# Patient Record
Sex: MALE | Race: WHITE | NOT HISPANIC OR LATINO | Employment: OTHER | URBAN - METROPOLITAN AREA
[De-identification: names, ages, dates, MRNs, and addresses within clinical notes are randomized per-mention and may not be internally consistent; named-entity substitution may affect disease eponyms.]

---

## 2017-01-31 RX ORDER — LISINOPRIL 10 MG/1
TABLET ORAL
Qty: 90 TABLET | Refills: 0 | OUTPATIENT
Start: 2017-01-31

## 2017-01-31 RX ORDER — LISINOPRIL 10 MG/1
10 TABLET ORAL DAILY
Qty: 90 TABLET | Refills: 3 | Status: SHIPPED | OUTPATIENT
Start: 2017-01-31 | End: 2018-02-05 | Stop reason: SDUPTHER

## 2018-02-05 RX ORDER — LISINOPRIL 10 MG/1
10 TABLET ORAL DAILY
Qty: 90 TABLET | Refills: 3 | Status: SHIPPED | OUTPATIENT
Start: 2018-02-05 | End: 2019-10-30

## 2019-10-24 DIAGNOSIS — I35.1 AORTIC VALVE INSUFFICIENCY, ETIOLOGY OF CARDIAC VALVE DISEASE UNSPECIFIED: Primary | ICD-10-CM

## 2019-10-25 PROBLEM — I38 VALVULAR HEART DISEASE: Status: ACTIVE | Noted: 2019-10-25

## 2019-10-25 PROBLEM — I10 ESSENTIAL HYPERTENSION: Status: ACTIVE | Noted: 2019-10-25

## 2019-10-25 PROBLEM — Z82.49 FAMILY HISTORY OF CORONARY ARTERY DISEASE: Status: ACTIVE | Noted: 2019-10-25

## 2019-10-25 NOTE — PROGRESS NOTES
"  Subjective:     Encounter Date:10/30/2019      Patient ID: Erwin Morel is a 44 y.o. male.    Reason for consultation: Hypertension and valvular heart disease    Problem List:  1.  Valvular heart disease       A.  Echo (8/8/2012): Mild to moderate AR, mild MR, mild TR.  Normal LVEF 55-60%       B.  Echo (11/6/2013): Mild to moderate AS.  Normal LVEF.  Mild MR and mild TR.       C.  Echo (11/4/2015): Normal LVEF 55-60%.  Trace AI and MR.  Mild TR.       D.  Echo (10/30/2019): Normal LVEF.  Trace AI and MR  2.  Hypertension  3.  Family history of coronary artery disease       A.  Stress echo (9/6/2012): Normal.  Hypertensive response to stress  4. Surgical history       A. Status post vasectomy in 2011.       B. Cholecystectomy.    No Known Allergies    No current outpatient medications on file.    HPI:    Patient is a 44-year-old male returns today as a self-referral consult due to his a history of valvular heart disease noted on echocardiograms from 3567-1468 that displayed mild to moderate aortic, mitral and tricuspid regurgitation.  Patient has a family history of coronary artery disease as his father had CABG surgery in his 40s.  He has a history of hypertension and also had a hypertensive response on a stress echocardiogram in 2012.  He was previously taking lisinopril but for reasons that remain unclear he stopped taking it.  He reports he could never really tell a difference being on it or off of it.  He has historically been very active working out at the gym and running but 18 months ago he \"broke his tailbone\" while playing basketball so he has not been quite as active as usual.  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  He also performed his own yard work this past summer.  He was able to be active without any increased dyspnea, chest pain/pressure or tightness.  He also denies palpitations, presyncope or syncope.   He underwent echocardiogram prior to his visit today which showed normal " "LVEF and mild AI and MR and unchanged from prior echo in 2015. They had their children checked out recently due to family history.  He reports one other daughters had a congenital anomaly.      Cardiac Risk Factors:  Family history, hypertension, male gender    Social History:  , 3 children, non smoker, no alcohol use. Gun store owner    Family history:  Family history of CAD, Father had CABG in 40's. Mom's aorta structurally abnormal but no CAD. Mom lung and brain cancer.     Review of Systems:  The following portions of the patient's history were reviewed and updated as appropriate: allergies, current medications and problem list.    The above systems were reviewed and pertinent positives were noted.    Objective:        Blood pressure 164/98, pulse 65, height 188 cm (74\"), weight 102 kg (225 lb 3.2 oz).    Physical Exam    General:Well-developed well-nourished, no acute distress  HEENT: Pupils equal round and reactive to light.  Oropharynx is clear and moist.  CV: Regular rate and rhythm.  Nondisplaced focal PMI  Resp: The lungs are clear bilaterally with no rales or wheezes.  Equal expansion is noted.  GI: The abdomen is soft and nontender with normal bowel sounds throughout.  No hepatosplenomegaly or midline bruits are present.  : deferred  Musculoskeletal: No gross joint deformities are noted  Skin: Warm and dry  Neurologic: Nonfocal  Psychiatric: Normal mood and affect    Hospital Outpatient Visit on 10/30/2019   Component Date Value Ref Range Status   • BSA 10/30/2019 2.2  m^2 In process   • IVSd 10/30/2019 1.0  cm In process   • LVIDd 10/30/2019 4.8  cm In process   • LVIDs 10/30/2019 2.8  cm In process   • LVPWd 10/30/2019 0.87  cm In process   • IVS/LVPW 10/30/2019 1.2   In process   • FS 10/30/2019 41.4  % In process   • EDV(Teich) 10/30/2019 106.6  ml In process   • ESV(Teich) 10/30/2019 29.6  ml In process   • EF(Teich) 10/30/2019 72.3  % In process   • EDV(cubed) 10/30/2019 109.4  ml In " process   • ESV(cubed) 10/30/2019 22.0  ml In process   • EF(cubed) 10/30/2019 79.9  % In process   • LV mass(C)d 10/30/2019 156.3  grams In process   • LV mass(C)dI 10/30/2019 69.7  grams/m^2 In process   • SV(Teich) 10/30/2019 77.1  ml In process   • SI(Teich) 10/30/2019 34.4  ml/m^2 In process   • SV(cubed) 10/30/2019 87.4  ml In process   • SI(cubed) 10/30/2019 39.0  ml/m^2 In process   • Ao root diam 10/30/2019 3.2  cm In process   • Ao root area 10/30/2019 8.2  cm^2 In process   • LA dimension 10/30/2019 3.1  cm In process   • LA/Ao 10/30/2019 0.96   In process   • LVOT diam 10/30/2019 2.1  cm In process   • LVOT area 10/30/2019 3.5  cm^2 In process   • LVOT area(traced) 10/30/2019 3.5  cm^2 In process   • LAd major 10/30/2019 5.9  cm In process   • LVLd ap4 10/30/2019 8.8  cm In process   • EDV(MOD-sp4) 10/30/2019 101.0  ml In process   • LVLs ap4 10/30/2019 7.4  cm In process   • ESV(MOD-sp4) 10/30/2019 36.0  ml In process   • EF(MOD-sp4) 10/30/2019 64.4  % In process   • LVLd ap2 10/30/2019 8.5  cm In process   • EDV(MOD-sp2) 10/30/2019 65.0  ml In process   • LVLs ap2 10/30/2019 7.7  cm In process   • ESV(MOD-sp2) 10/30/2019 36.0  ml In process   • EF(MOD-sp2) 10/30/2019 44.6  % In process   • LA volume 10/30/2019 58.0  ml In process   • EF(MOD-bp) 10/30/2019 56.0  % In process   • SV(MOD-sp4) 10/30/2019 65.0  ml In process   • SI(MOD-sp4) 10/30/2019 29.0  ml/m^2 In process   • SV(MOD-sp2) 10/30/2019 29.0  ml In process   • SI(MOD-sp2) 10/30/2019 12.9  ml/m^2 In process   • Ao root area (BSA corrected) 10/30/2019 1.4   In process   • LV Marlow Vol (BSA corrected) 10/30/2019 45.1  ml/m^2 In process   • LV Sys Vol (BSA corrected) 10/30/2019 16.1  ml/m^2 In process   • LA Volume Index 10/30/2019 25.9  ml/m^2 In process   • MV E max kevin 10/30/2019 62.9  cm/sec In process   • MV A max kevin 10/30/2019 70.6  cm/sec In process   • MV E/A 10/30/2019 0.89   In process   • MV dec time 10/30/2019 0.24  sec In process   •  Ao pk morris 10/30/2019 119.4  cm/sec In process   • Ao max PG 10/30/2019 5.7  mmHg In process   • Ao max PG (full) 10/30/2019 -0.43  mmHg In process   • LOLITA(V,A) 10/30/2019 3.6  cm^2 In process   • LOLITA(V,D) 10/30/2019 3.6  cm^2 In process   • AI max morris 10/30/2019 346.9  cm/sec In process   • AI max PG 10/30/2019 48.1  mmHg In process   • AI dec slope 10/30/2019 132.7  cm/sec^2 In process   • AI P1/2t 10/30/2019 765.5  msec In process   • LV V1 max PG 10/30/2019 6.1  mmHg In process   • LV V1 mean PG 10/30/2019 3.1  mmHg In process   • LV V1 max 10/30/2019 123.9  cm/sec In process   • LV V1 mean 10/30/2019 81.8  cm/sec In process   • LV V1 VTI 10/30/2019 25.9  cm In process   • SV(LVOT) 10/30/2019 89.9  ml In process   • SI(LVOT) 10/30/2019 40.1  ml/m^2 In process   • PA V2 max 10/30/2019 125.2  cm/sec In process   • PA max PG 10/30/2019 6.3  mmHg In process   • PA acc slope 10/30/2019 388.3  cm/sec^2 In process   • PA acc time 10/30/2019 0.15  sec In process   • PA pr(Accel) 10/30/2019 9.3  mmHg In process   • Lat E/e'  10/30/2019 4.7   In process   • Med E/e' 10/30/2019 9.5   In process   • Lat Peak E' Morris 10/30/2019 13.0  cm/sec In process   • Med Peak E' Morris 10/30/2019 6.5  cm/sec In process   • BH CV ECHO OSVALDO - BZI_BMI 10/30/2019 27.6  kilograms/m^2 In process   • BH CV ECHO OSVALDO - BSA(HAYCOCK) 10/30/2019 2.3  m^2 In process   • BH CV ECHO OSVALDO - BZI_METRIC_WEIGHT 10/30/2019 97.5  kg In process   • BH CV ECHO OSVALDO - BZI_METRIC_HEIGHT 10/30/2019 188.0  cm In process   • Avg E/e' ratio 10/30/2019 6.45   In process   • Target HR (85%) 10/30/2019 150  bpm In process   • Max. Pred. HR (100%) 10/30/2019 177  bpm In process   • BH CV VAS BP LEFT ARM 10/30/2019 162/98  mmHg In process   • TDI S' 10/30/2019 11.80  cm/sec In process   • RV Base 10/30/2019 3.60  cm In process   • RV Length 10/30/2019 7.70  cm In process   • RV Mid 10/30/2019 2.20  cm In process   • TAPSE (>1.6) 10/30/2019 2.20  cm2 In process        Diagnostic Data:          ECG 12 Lead  Date/Time: 10/30/2019 9:49 AM  Performed by: Kenya Natarajan APRN  Authorized by: Kenya Natarajan APRN   Comparison: compared with previous ECG   Rhythm: sinus rhythm  BPM: 65    Clinical impression: normal ECG  Comments: NSR  QRS 92 MS  QT/QTc 358/372            Assessment:       ICD-10-CM ICD-9-CM   1. Valvular heart disease I38 424.90   2. Family history of coronary artery disease Z82.49 V17.3   3. Essential hypertension I10 401.9          Plan:         1. Valvular heart disease: Routine surveillance every 4 years unless becomes symptomatic.   2. Reduce risk factors for cardiovascular disease including lifestyle modifications by maintaining normal blood pressure, refrain from smoking and healthy heart diet.  Increase low impact    physical activity using a elliptical  and/or stationary bicycle  3. Obtain recent blood work from PCP for lipids and CMP.   4.  Monitor blood pressures and follow-up with PCP for management.  Would recommend restarting lisinopril  5.  Follow-up in 4 years with repeat echocardiogram      FRANCY Varela scribe for Dr. Preethi Anaya    I Preethi Anaya MD personally performed the services described in this documentation as scribed by the above individual in my presence, and it is both accurate and complete.    Preethi Anaya MD, FACC

## 2019-10-30 ENCOUNTER — CONSULT (OUTPATIENT)
Dept: CARDIOLOGY | Facility: CLINIC | Age: 44
End: 2019-10-30

## 2019-10-30 ENCOUNTER — HOSPITAL ENCOUNTER (OUTPATIENT)
Dept: CARDIOLOGY | Facility: HOSPITAL | Age: 44
Discharge: HOME | End: 2019-10-30
Admitting: INTERNAL MEDICINE

## 2019-10-30 VITALS
WEIGHT: 225.2 LBS | BODY MASS INDEX: 28.9 KG/M2 | HEIGHT: 74 IN | DIASTOLIC BLOOD PRESSURE: 98 MMHG | SYSTOLIC BLOOD PRESSURE: 164 MMHG | HEART RATE: 65 BPM

## 2019-10-30 VITALS — BODY MASS INDEX: 27.59 KG/M2 | WEIGHT: 214.95 LBS | HEIGHT: 74 IN

## 2019-10-30 DIAGNOSIS — I38 VALVULAR HEART DISEASE: Primary | ICD-10-CM

## 2019-10-30 DIAGNOSIS — Z82.49 FAMILY HISTORY OF CORONARY ARTERY DISEASE: ICD-10-CM

## 2019-10-30 DIAGNOSIS — I35.1 AORTIC VALVE INSUFFICIENCY, ETIOLOGY OF CARDIAC VALVE DISEASE UNSPECIFIED: ICD-10-CM

## 2019-10-30 DIAGNOSIS — I10 ESSENTIAL HYPERTENSION: ICD-10-CM

## 2019-10-30 LAB
BH CV ECHO MEAS - AI DEC SLOPE: 132.7 CM/SEC^2
BH CV ECHO MEAS - AI MAX PG: 48.1 MMHG
BH CV ECHO MEAS - AI MAX VEL: 346.9 CM/SEC
BH CV ECHO MEAS - AI P1/2T: 765.5 MSEC
BH CV ECHO MEAS - AO MAX PG (FULL): -0.43 MMHG
BH CV ECHO MEAS - AO MAX PG: 5.7 MMHG
BH CV ECHO MEAS - AO ROOT AREA (BSA CORRECTED): 1.4
BH CV ECHO MEAS - AO ROOT AREA: 8.2 CM^2
BH CV ECHO MEAS - AO ROOT DIAM: 3.2 CM
BH CV ECHO MEAS - AO V2 MAX: 119.4 CM/SEC
BH CV ECHO MEAS - AVA(V,A): 3.6 CM^2
BH CV ECHO MEAS - AVA(V,D): 3.6 CM^2
BH CV ECHO MEAS - BSA(HAYCOCK): 2.3 M^2
BH CV ECHO MEAS - BSA: 2.2 M^2
BH CV ECHO MEAS - BZI_BMI: 27.6 KILOGRAMS/M^2
BH CV ECHO MEAS - BZI_METRIC_HEIGHT: 188 CM
BH CV ECHO MEAS - BZI_METRIC_WEIGHT: 97.5 KG
BH CV ECHO MEAS - EDV(CUBED): 109.4 ML
BH CV ECHO MEAS - EDV(MOD-SP2): 65 ML
BH CV ECHO MEAS - EDV(MOD-SP4): 101 ML
BH CV ECHO MEAS - EDV(TEICH): 106.6 ML
BH CV ECHO MEAS - EF(CUBED): 79.9 %
BH CV ECHO MEAS - EF(MOD-BP): 56 %
BH CV ECHO MEAS - EF(MOD-SP2): 44.6 %
BH CV ECHO MEAS - EF(MOD-SP4): 64.4 %
BH CV ECHO MEAS - EF(TEICH): 72.3 %
BH CV ECHO MEAS - ESV(CUBED): 22 ML
BH CV ECHO MEAS - ESV(MOD-SP2): 36 ML
BH CV ECHO MEAS - ESV(MOD-SP4): 36 ML
BH CV ECHO MEAS - ESV(TEICH): 29.6 ML
BH CV ECHO MEAS - FS: 41.4 %
BH CV ECHO MEAS - IVS/LVPW: 1.2
BH CV ECHO MEAS - IVSD: 1 CM
BH CV ECHO MEAS - LA DIMENSION: 3.1 CM
BH CV ECHO MEAS - LA/AO: 0.96
BH CV ECHO MEAS - LAD MAJOR: 5.9 CM
BH CV ECHO MEAS - LAT PEAK E' VEL: 13 CM/SEC
BH CV ECHO MEAS - LATERAL E/E' RATIO: 4.7
BH CV ECHO MEAS - LV DIASTOLIC VOL/BSA (35-75): 45.1 ML/M^2
BH CV ECHO MEAS - LV MASS(C)D: 156.3 GRAMS
BH CV ECHO MEAS - LV MASS(C)DI: 69.7 GRAMS/M^2
BH CV ECHO MEAS - LV MAX PG: 6.1 MMHG
BH CV ECHO MEAS - LV MEAN PG: 3.1 MMHG
BH CV ECHO MEAS - LV SYSTOLIC VOL/BSA (12-30): 16.1 ML/M^2
BH CV ECHO MEAS - LV V1 MAX: 123.9 CM/SEC
BH CV ECHO MEAS - LV V1 MEAN: 81.8 CM/SEC
BH CV ECHO MEAS - LV V1 VTI: 25.9 CM
BH CV ECHO MEAS - LVIDD: 4.8 CM
BH CV ECHO MEAS - LVIDS: 2.8 CM
BH CV ECHO MEAS - LVLD AP2: 8.5 CM
BH CV ECHO MEAS - LVLD AP4: 8.8 CM
BH CV ECHO MEAS - LVLS AP2: 7.7 CM
BH CV ECHO MEAS - LVLS AP4: 7.4 CM
BH CV ECHO MEAS - LVOT AREA (M): 3.5 CM^2
BH CV ECHO MEAS - LVOT AREA: 3.5 CM^2
BH CV ECHO MEAS - LVOT DIAM: 2.1 CM
BH CV ECHO MEAS - LVPWD: 0.87 CM
BH CV ECHO MEAS - MED PEAK E' VEL: 6.5 CM/SEC
BH CV ECHO MEAS - MEDIAL E/E' RATIO: 9.5
BH CV ECHO MEAS - MV A MAX VEL: 70.6 CM/SEC
BH CV ECHO MEAS - MV DEC TIME: 0.24 SEC
BH CV ECHO MEAS - MV E MAX VEL: 62.9 CM/SEC
BH CV ECHO MEAS - MV E/A: 0.89
BH CV ECHO MEAS - PA ACC SLOPE: 388.3 CM/SEC^2
BH CV ECHO MEAS - PA ACC TIME: 0.15 SEC
BH CV ECHO MEAS - PA MAX PG: 6.3 MMHG
BH CV ECHO MEAS - PA PR(ACCEL): 9.3 MMHG
BH CV ECHO MEAS - PA V2 MAX: 125.2 CM/SEC
BH CV ECHO MEAS - SI(CUBED): 39 ML/M^2
BH CV ECHO MEAS - SI(LVOT): 40.1 ML/M^2
BH CV ECHO MEAS - SI(MOD-SP2): 12.9 ML/M^2
BH CV ECHO MEAS - SI(MOD-SP4): 29 ML/M^2
BH CV ECHO MEAS - SI(TEICH): 34.4 ML/M^2
BH CV ECHO MEAS - SV(CUBED): 87.4 ML
BH CV ECHO MEAS - SV(LVOT): 89.9 ML
BH CV ECHO MEAS - SV(MOD-SP2): 29 ML
BH CV ECHO MEAS - SV(MOD-SP4): 65 ML
BH CV ECHO MEAS - SV(TEICH): 77.1 ML
BH CV ECHO MEAS - TAPSE (>1.6): 2.2 CM2
BH CV ECHO MEASUREMENTS AVERAGE E/E' RATIO: 6.45
BH CV VAS BP LEFT ARM: NORMAL MMHG
BH CV XLRA - RV BASE: 3.6 CM
BH CV XLRA - RV LENGTH: 7.7 CM
BH CV XLRA - RV MID: 2.2 CM
BH CV XLRA - TDI S': 11.8 CM/SEC
LEFT ATRIUM VOLUME INDEX: 25.9 ML/M^2
LEFT ATRIUM VOLUME: 58 ML
LV EF 2D ECHO EST: 55 %
MAXIMAL PREDICTED HEART RATE: 177 BPM
STRESS TARGET HR: 150 BPM

## 2019-10-30 PROCEDURE — 99203 OFFICE O/P NEW LOW 30 MIN: CPT | Performed by: INTERNAL MEDICINE

## 2019-10-30 PROCEDURE — 93306 TTE W/DOPPLER COMPLETE: CPT | Performed by: INTERNAL MEDICINE

## 2019-10-30 PROCEDURE — 93306 TTE W/DOPPLER COMPLETE: CPT

## 2019-10-30 PROCEDURE — 93000 ELECTROCARDIOGRAM COMPLETE: CPT | Performed by: NURSE PRACTITIONER

## 2021-06-11 DIAGNOSIS — I38 VALVULAR HEART DISEASE: Primary | ICD-10-CM

## 2022-04-19 ENCOUNTER — LAB (OUTPATIENT)
Dept: FAMILY MEDICINE CLINIC | Facility: CLINIC | Age: 47
End: 2022-04-19

## 2022-04-19 DIAGNOSIS — M25.561 ACUTE PAIN OF RIGHT KNEE: Primary | ICD-10-CM

## 2022-04-19 DIAGNOSIS — M25.561 ACUTE PAIN OF RIGHT KNEE: ICD-10-CM

## 2022-04-19 PROCEDURE — 36415 COLL VENOUS BLD VENIPUNCTURE: CPT | Performed by: NURSE PRACTITIONER

## 2022-04-19 RX ORDER — PREDNISONE 20 MG/1
TABLET ORAL
Qty: 18 TABLET | Refills: 0 | Status: SHIPPED | OUTPATIENT
Start: 2022-04-19

## 2022-04-19 RX ORDER — SULFAMETHOXAZOLE AND TRIMETHOPRIM 800; 160 MG/1; MG/1
1 TABLET ORAL 2 TIMES DAILY
Qty: 20 TABLET | Refills: 0 | Status: SHIPPED | OUTPATIENT
Start: 2022-04-19

## 2022-04-20 LAB
ALBUMIN SERPL-MCNC: 4.5 G/DL (ref 4–5)
ALBUMIN/GLOB SERPL: 2 {RATIO} (ref 1.2–2.2)
ALP SERPL-CCNC: 56 IU/L (ref 44–121)
ALT SERPL-CCNC: 19 IU/L (ref 0–44)
AST SERPL-CCNC: 20 IU/L (ref 0–40)
BASOPHILS # BLD AUTO: 0.1 X10E3/UL (ref 0–0.2)
BASOPHILS NFR BLD AUTO: 1 %
BILIRUB SERPL-MCNC: 0.5 MG/DL (ref 0–1.2)
BUN SERPL-MCNC: 12 MG/DL (ref 6–24)
BUN/CREAT SERPL: 12 (ref 9–20)
CALCIUM SERPL-MCNC: 9.4 MG/DL (ref 8.7–10.2)
CHLORIDE SERPL-SCNC: 106 MMOL/L (ref 96–106)
CO2 SERPL-SCNC: 23 MMOL/L (ref 20–29)
CREAT SERPL-MCNC: 1.01 MG/DL (ref 0.76–1.27)
EGFRCR SERPLBLD CKD-EPI 2021: 93 ML/MIN/1.73
EOSINOPHIL # BLD AUTO: 0.1 X10E3/UL (ref 0–0.4)
EOSINOPHIL NFR BLD AUTO: 1 %
ERYTHROCYTE [DISTWIDTH] IN BLOOD BY AUTOMATED COUNT: 12.1 % (ref 11.6–15.4)
ERYTHROCYTE [SEDIMENTATION RATE] IN BLOOD BY WESTERGREN METHOD: 2 MM/HR (ref 0–15)
GLOBULIN SER CALC-MCNC: 2.3 G/DL (ref 1.5–4.5)
GLUCOSE SERPL-MCNC: 80 MG/DL (ref 65–99)
HCT VFR BLD AUTO: 45.4 % (ref 37.5–51)
HGB BLD-MCNC: 15.8 G/DL (ref 13–17.7)
IMM GRANULOCYTES # BLD AUTO: 0 X10E3/UL (ref 0–0.1)
IMM GRANULOCYTES NFR BLD AUTO: 0 %
LYMPHOCYTES # BLD AUTO: 1.4 X10E3/UL (ref 0.7–3.1)
LYMPHOCYTES NFR BLD AUTO: 15 %
MCH RBC QN AUTO: 30.2 PG (ref 26.6–33)
MCHC RBC AUTO-ENTMCNC: 34.8 G/DL (ref 31.5–35.7)
MCV RBC AUTO: 87 FL (ref 79–97)
MONOCYTES # BLD AUTO: 1 X10E3/UL (ref 0.1–0.9)
MONOCYTES NFR BLD AUTO: 10 %
NEUTROPHILS # BLD AUTO: 6.9 X10E3/UL (ref 1.4–7)
NEUTROPHILS NFR BLD AUTO: 73 %
PLATELET # BLD AUTO: 232 X10E3/UL (ref 150–450)
POTASSIUM SERPL-SCNC: 4.6 MMOL/L (ref 3.5–5.2)
PROT SERPL-MCNC: 6.8 G/DL (ref 6–8.5)
RBC # BLD AUTO: 5.23 X10E6/UL (ref 4.14–5.8)
SODIUM SERPL-SCNC: 144 MMOL/L (ref 134–144)
URATE SERPL-MCNC: 6.7 MG/DL (ref 3.8–8.4)
WBC # BLD AUTO: 9.4 X10E3/UL (ref 3.4–10.8)

## 2022-11-02 DIAGNOSIS — R53.83 OTHER FATIGUE: Primary | ICD-10-CM

## 2022-11-02 DIAGNOSIS — I10 PRIMARY HYPERTENSION: ICD-10-CM

## 2022-11-02 DIAGNOSIS — R06.83 SNORING: ICD-10-CM

## 2023-11-08 ENCOUNTER — AMBULATORY SURGICAL CENTER (OUTPATIENT)
Dept: URBAN - METROPOLITAN AREA SURGERY 10 | Facility: SURGERY | Age: 48
End: 2023-11-08

## 2023-11-08 ENCOUNTER — OFFICE (OUTPATIENT)
Dept: URBAN - METROPOLITAN AREA PATHOLOGY 4 | Facility: PATHOLOGY | Age: 48
End: 2023-11-08

## 2023-11-08 DIAGNOSIS — K21.9 GASTRO-ESOPHAGEAL REFLUX DISEASE WITHOUT ESOPHAGITIS: ICD-10-CM

## 2023-11-08 DIAGNOSIS — K20.0 EOSINOPHILIC ESOPHAGITIS: ICD-10-CM

## 2023-11-08 DIAGNOSIS — J39.2 OTHER DISEASES OF PHARYNX: ICD-10-CM

## 2023-11-08 DIAGNOSIS — K29.80 DUODENITIS WITHOUT BLEEDING: ICD-10-CM

## 2023-11-08 DIAGNOSIS — R13.10 DYSPHAGIA, UNSPECIFIED: ICD-10-CM

## 2023-11-08 DIAGNOSIS — K31.89 OTHER DISEASES OF STOMACH AND DUODENUM: ICD-10-CM

## 2023-11-08 DIAGNOSIS — R14.0 ABDOMINAL DISTENSION (GASEOUS): ICD-10-CM

## 2023-11-08 DIAGNOSIS — K22.4 DYSKINESIA OF ESOPHAGUS: ICD-10-CM

## 2023-11-08 PROCEDURE — 43249 ESOPH EGD DILATION <30 MM: CPT | Performed by: INTERNAL MEDICINE

## 2023-11-08 PROCEDURE — 43239 EGD BIOPSY SINGLE/MULTIPLE: CPT | Mod: 59 | Performed by: INTERNAL MEDICINE

## 2023-11-08 PROCEDURE — 88305 TISSUE EXAM BY PATHOLOGIST: CPT | Performed by: INTERNAL MEDICINE

## 2023-12-12 ENCOUNTER — OFFICE (OUTPATIENT)
Dept: URBAN - METROPOLITAN AREA CLINIC 4 | Facility: CLINIC | Age: 48
End: 2023-12-12
Payer: COMMERCIAL

## 2023-12-12 VITALS — SYSTOLIC BLOOD PRESSURE: 132 MMHG | WEIGHT: 232 LBS | HEIGHT: 74 IN | DIASTOLIC BLOOD PRESSURE: 80 MMHG

## 2023-12-12 DIAGNOSIS — K86.81 EXOCRINE PANCREATIC INSUFFICIENCY: ICD-10-CM

## 2023-12-12 DIAGNOSIS — R14.0 ABDOMINAL DISTENSION (GASEOUS): ICD-10-CM

## 2023-12-12 DIAGNOSIS — K20.0 EOSINOPHILIC ESOPHAGITIS: ICD-10-CM

## 2023-12-12 DIAGNOSIS — K58.0 IRRITABLE BOWEL SYNDROME WITH DIARRHEA: ICD-10-CM

## 2023-12-12 DIAGNOSIS — K59.89 OTHER SPECIFIED FUNCTIONAL INTESTINAL DISORDERS: ICD-10-CM

## 2023-12-12 DIAGNOSIS — R10.11 RIGHT UPPER QUADRANT PAIN: ICD-10-CM

## 2023-12-12 DIAGNOSIS — R10.9 UNSPECIFIED ABDOMINAL PAIN: ICD-10-CM

## 2023-12-12 PROCEDURE — 99214 OFFICE O/P EST MOD 30 MIN: CPT | Performed by: INTERNAL MEDICINE

## 2023-12-12 RX ORDER — RIFAXIMIN 550 MG/1
1650 TABLET ORAL
Qty: 42 | Refills: 2 | Status: ACTIVE
Start: 2023-12-12

## 2023-12-12 RX ORDER — BUSPIRONE HYDROCHLORIDE 10 MG/1
TABLET ORAL
Qty: 60 | Refills: 11 | Status: ACTIVE
Start: 2023-12-12

## 2023-12-13 ENCOUNTER — TRANSCRIBE ORDERS (OUTPATIENT)
Dept: NUTRITION | Facility: HOSPITAL | Age: 48
End: 2023-12-13
Payer: COMMERCIAL

## 2023-12-13 DIAGNOSIS — R14.0 ABDOMINAL DISTENTION: Primary | ICD-10-CM

## 2023-12-13 DIAGNOSIS — K59.1 FUNCTIONAL DIARRHEA: ICD-10-CM

## 2023-12-13 DIAGNOSIS — K86.81 CONGENITAL DEFICIENCY OF PANCREATIC LIPASE: ICD-10-CM

## 2023-12-13 DIAGNOSIS — K58.0 IRRITABLE BOWEL SYNDROME WITH DIARRHEA: ICD-10-CM

## 2023-12-27 ENCOUNTER — HOSPITAL ENCOUNTER (OUTPATIENT)
Dept: NUTRITION | Facility: HOSPITAL | Age: 48
Setting detail: RECURRING SERIES
Discharge: HOME | End: 2023-12-27

## 2023-12-27 PROCEDURE — 97802 MEDICAL NUTRITION INDIV IN: CPT

## 2023-12-27 NOTE — CONSULTS
[facility] Nutrition Services          Initial 60 Minute Nutrition Visit    Date: 2023   Patient Name: Erwin Morel  : 1975   MRN: [id number]   Referring Provider: Jerry He MD    Reason for Visit: IBS  Visit Format: TEAMS    Nutrition Assessment       Social History:   Social History     Socioeconomic History    Marital status:    Tobacco Use    Smoking status: Never    Smokeless tobacco: Never   Substance and Sexual Activity    Alcohol use: No    Drug use: No    Sexual activity: Defer     Active Problem List:   Patient Active Problem List    Diagnosis     Essential hypertension [I10]     Family history of coronary artery disease [Z82.49]     Valvular heart disease [I38]       Current Medications:   Current Outpatient Medications:     predniSONE (DELTASONE) 20 MG tablet, 3 QD x 3 days, 2 QD x 3 days, 1 QD x 3 days, Disp: 18 tablet, Rfl: 0    sulfamethoxazole-trimethoprim (Bactrim DS) 800-160 MG per tablet, Take 1 tablet by mouth 2 (Two) Times a Day., Disp: 20 tablet, Rfl: 0    Labs: NA    Hunger Vital Sign Food Insecurity Assessment:  Within the past 12 months I/we worried whether our food would run out before I/we got money to buy more: No   Within the past 12 months the food I/we bought just didn't last and I/we didn't have money to get more: No   Use of food assistance programs (WIC, food stamps, food haile) No       Food & Nutrition Related History       Food Allergies: Grapes, pineapple, apple, gluten, avocado  Food Intolerances: None indicated  Food Behavior: Patient will not eat to avoid GI upset  Nutrition Impact Symptoms:  Bloating, nausea, some reflux. Previously experienced vomiting and diarrhea.  Has since improved d/t  medication changes.  Gastrointestinal conditions that impact intake or food choices: EPI and IBS  Details at home: Lives with wife and children  Who prepares most meals: Wife   Who does grocery shopping: Wife   How many meals are purchased  "from fast food/sit down restaurants per week:  Did not discuss  Difficulty chewin - Normal  Difficulty swallowin - Normal  Diet requirement related to personal preference or cultural belief:  Avoids many foods and sticks with \"safe\" foods d/t GI distress.  History of eating disorder/disordered eating habits: None  Language/communication details: English  Barriers to learning: No barriers identified at this time    24 Hour Recall:   Time Food/beverages consumed   S GF chex mix and vanilla yogurt   D Corn tacos, tomatoes, and cheese   S Popcorn                         Additional comments: Patient does not eat consistently. Usually 2 meals per day and some snacks. Does not consume gluten at all. Drinks about 15 propel urias per day.     Anthropometrics      Height:   Ht Readings from Last 1 Encounters:   10/30/19 188 cm (74.02\")     Weight:   Wt Readings from Last 3 Encounters:   10/30/19 97.5 kg (214 lb 15.2 oz)   10/30/19 102 kg (225 lb 3.2 oz)     BMI: There is no height or weight on file to calculate BMI.   Weight Change: None     Physical Activity     Barriers to physical activity: Jonathan knox a few years ago     Physical activity comments: Getting back into biking     Estimated Needs     Estimated Energy Needs: did not discuss    Estimated Protein Needs: did not discuss     Estimated Fluid Needs: current intake meets needs     Discussion / Education      Patient seen via Teams for IBS/low fodmap referral. Patient reports he had his gallbladder removed about 10 years ago and since then has had GI issues. Patient has gone through testing, he is very allergic to gluten and has removed it from his diet. Patient diagnosed with EPI, but is not currently taking Creon d/t a reaction.     RD educated patient on what fodmaps are. Reviewed the importance of hydration and fiber. Discussed high fodmap foods and their low fodmap alternatives.  Discussed removing high fodmap foods for 2-3 weeks, then reintroducing a " high fodmap food every few days. RD suggested monitoring symptoms in a food diary. High fiber food list provided and RD recommended 38 grams of fiber per day.     Patient states he believes he lacks the ability to digest sugars. Recommended speaking with MD as patient may lack the ability to digest sucrase indicating CSID.     Assessment of patient engagement: Engaged    Measurement of understanding: Patient verbalized understanding    Resources Provided: [facility] Fodmap Basic     Goal (s)      Goal 1: Eliminate high fodmap foods for 2-3 weeks     Goal 2: Reintroduce high fodmap foods, keeping track of symptoms     Goal 3: Increase fiber intake to 38 grams/d     Plan of Care     PES Statement:   Altered GI function related to IBS/EPI as evidence by bloating, nausea, and decreased appetite.     Follow Up Visit      Follow Up:   Patient to schedule    Total of 60 minutes spent with patient on nutrition counseling. Education based on Academy of Nutrition and Dietetics guidelines. Patient was provided with RD's contact information. Thank you for this referral.

## 2024-01-16 ENCOUNTER — OFFICE (OUTPATIENT)
Dept: URBAN - METROPOLITAN AREA CLINIC 4 | Facility: CLINIC | Age: 49
End: 2024-01-16

## 2024-01-16 VITALS — HEIGHT: 74 IN | SYSTOLIC BLOOD PRESSURE: 130 MMHG | WEIGHT: 224 LBS | DIASTOLIC BLOOD PRESSURE: 84 MMHG

## 2024-01-16 DIAGNOSIS — R14.0 ABDOMINAL DISTENSION (GASEOUS): ICD-10-CM

## 2024-01-16 DIAGNOSIS — K63.8219 SMALL INTESTINAL BACTERIAL OVERGROWTH, UNSPECIFIED: ICD-10-CM

## 2024-01-16 DIAGNOSIS — R10.9 UNSPECIFIED ABDOMINAL PAIN: ICD-10-CM

## 2024-01-16 DIAGNOSIS — K59.89 OTHER SPECIFIED FUNCTIONAL INTESTINAL DISORDERS: ICD-10-CM

## 2024-01-16 DIAGNOSIS — K86.81 EXOCRINE PANCREATIC INSUFFICIENCY: ICD-10-CM

## 2024-01-16 PROCEDURE — 99214 OFFICE O/P EST MOD 30 MIN: CPT | Performed by: INTERNAL MEDICINE

## 2024-01-22 ENCOUNTER — OFFICE (OUTPATIENT)
Dept: URBAN - METROPOLITAN AREA PATHOLOGY 4 | Facility: PATHOLOGY | Age: 49
End: 2024-01-22

## 2024-01-22 ENCOUNTER — AMBULATORY SURGICAL CENTER (OUTPATIENT)
Dept: URBAN - METROPOLITAN AREA SURGERY 10 | Facility: SURGERY | Age: 49
End: 2024-01-22

## 2024-01-22 DIAGNOSIS — D12.0 BENIGN NEOPLASM OF CECUM: ICD-10-CM

## 2024-01-22 DIAGNOSIS — D12.4 BENIGN NEOPLASM OF DESCENDING COLON: ICD-10-CM

## 2024-01-22 DIAGNOSIS — R14.0 ABDOMINAL DISTENSION (GASEOUS): ICD-10-CM

## 2024-01-22 DIAGNOSIS — R10.9 UNSPECIFIED ABDOMINAL PAIN: ICD-10-CM

## 2024-01-22 DIAGNOSIS — K64.1 SECOND DEGREE HEMORRHOIDS: ICD-10-CM

## 2024-01-22 PROCEDURE — 88305 TISSUE EXAM BY PATHOLOGIST: CPT | Performed by: INTERNAL MEDICINE

## 2024-01-22 PROCEDURE — 45385 COLONOSCOPY W/LESION REMOVAL: CPT | Performed by: INTERNAL MEDICINE

## 2024-04-08 ENCOUNTER — OFFICE VISIT (OUTPATIENT)
Dept: FAMILY MEDICINE CLINIC | Facility: CLINIC | Age: 49
End: 2024-04-08
Payer: COMMERCIAL

## 2024-04-08 VITALS
BODY MASS INDEX: 29.77 KG/M2 | SYSTOLIC BLOOD PRESSURE: 140 MMHG | HEART RATE: 79 BPM | HEIGHT: 74 IN | WEIGHT: 232 LBS | OXYGEN SATURATION: 95 % | DIASTOLIC BLOOD PRESSURE: 90 MMHG

## 2024-04-08 DIAGNOSIS — M25.521 ELBOW PAIN, RIGHT: Primary | ICD-10-CM

## 2024-04-08 PROCEDURE — 99213 OFFICE O/P EST LOW 20 MIN: CPT | Performed by: NURSE PRACTITIONER

## 2024-04-08 RX ORDER — PREDNISONE 20 MG/1
TABLET ORAL
Qty: 18 TABLET | Refills: 0 | Status: SHIPPED | OUTPATIENT
Start: 2024-04-08

## 2024-04-08 NOTE — PROGRESS NOTES
"Chief Complaint  Elbow Pain (Right elbow pain x several days, only getting worse )    Subjective          Erwin Morel presents to [facility]  History of Present Illness  Pt has had right elbow pain x 1 week. He denies known injury but worked with remodeling prior to onset. The pain radiates down his right arm and he feels that his  strength is affected.   Elbow Pain  Associated symptoms include arthralgias. Pertinent negatives include no chest pain, fatigue or fever.       Objective   Vital Signs:   /90   Pulse 79   Ht 188 cm (74\")   Wt 105 kg (232 lb)   SpO2 95%   BMI 29.79 kg/m²     Body mass index is 29.79 kg/m².    Review of Systems   Constitutional:  Negative for fatigue and fever.   Respiratory:  Negative for shortness of breath.    Cardiovascular:  Negative for chest pain, palpitations and leg swelling.   Musculoskeletal:  Positive for arthralgias.   Neurological:  Negative for syncope.   Psychiatric/Behavioral:  The patient is not nervous/anxious.           Current Outpatient Medications:     predniSONE (DELTASONE) 20 MG tablet, 3 QD x 3 days, 2 QD x 3 days, 1 QD x 3 days, Disp: 18 tablet, Rfl: 0      Allergies: Patient has no known allergies.    Physical Exam  Constitutional:       Appearance: Normal appearance.   HENT:      Head: Normocephalic.   Eyes:      Conjunctiva/sclera: Conjunctivae normal.      Pupils: Pupils are equal, round, and reactive to light.   Cardiovascular:      Rate and Rhythm: Normal rate and regular rhythm.      Heart sounds: Normal heart sounds.   Pulmonary:      Effort: Pulmonary effort is normal.      Breath sounds: Normal breath sounds.   Abdominal:      Tenderness: There is no abdominal tenderness.   Musculoskeletal:         General: Normal range of motion.      Comments: Tenderness right lateral elbow   Skin:     General: Skin is warm and dry.      Capillary Refill: Capillary refill takes less than 2 seconds.   Neurological:      " General: No focal deficit present.      Mental Status: He is alert and oriented to person, place, and time.   Psychiatric:         Mood and Affect: Mood normal.         Behavior: Behavior normal.         Thought Content: Thought content normal.         Judgment: Judgment normal.          Result Review :                   Assessment and Plan    Diagnoses and all orders for this visit:    1. Elbow pain, right (Primary)  Comments:  Apply ice to painful areas and ROM stretching. Finish prednisone. RTC for worsened sx and if not improving in 1 week.  Orders:  -     predniSONE (DELTASONE) 20 MG tablet; 3 QD x 3 days, 2 QD x 3 days, 1 QD x 3 days  Dispense: 18 tablet; Refill: 0                Follow Up   Return in about 1 week (around 4/15/2024) for if not improving or sooner if symptoms worsen.  Patient was given instructions and counseling regarding his condition or for health maintenance advice. Please see specific information pulled into the AVS if appropriate.     FRANCY Valencia

## 2024-06-03 DIAGNOSIS — M25.521 ELBOW PAIN, RIGHT: Primary | ICD-10-CM

## 2024-06-04 ENCOUNTER — OFFICE VISIT (OUTPATIENT)
Dept: ORTHOPEDIC SURGERY | Facility: CLINIC | Age: 49
End: 2024-06-04
Payer: COMMERCIAL

## 2024-06-04 VITALS
DIASTOLIC BLOOD PRESSURE: 96 MMHG | WEIGHT: 228.5 LBS | SYSTOLIC BLOOD PRESSURE: 142 MMHG | HEIGHT: 74 IN | BODY MASS INDEX: 29.33 KG/M2

## 2024-06-04 DIAGNOSIS — M25.521 RIGHT ELBOW PAIN: Primary | ICD-10-CM

## 2024-06-04 DIAGNOSIS — M77.11 RIGHT LATERAL EPICONDYLITIS: ICD-10-CM

## 2024-06-04 DIAGNOSIS — M25.421 ELBOW SWELLING, RIGHT: ICD-10-CM

## 2024-06-04 RX ORDER — TRIAMCINOLONE ACETONIDE 40 MG/ML
40 INJECTION, SUSPENSION INTRA-ARTICULAR; INTRAMUSCULAR
Status: COMPLETED | OUTPATIENT
Start: 2024-06-04 | End: 2024-06-04

## 2024-06-04 RX ORDER — LIDOCAINE HYDROCHLORIDE 10 MG/ML
2 INJECTION, SOLUTION EPIDURAL; INFILTRATION; INTRACAUDAL; PERINEURAL
Status: COMPLETED | OUTPATIENT
Start: 2024-06-04 | End: 2024-06-04

## 2024-06-04 RX ADMIN — LIDOCAINE HYDROCHLORIDE 2 ML: 10 INJECTION, SOLUTION EPIDURAL; INFILTRATION; INTRACAUDAL; PERINEURAL at 09:14

## 2024-06-04 RX ADMIN — TRIAMCINOLONE ACETONIDE 40 MG: 40 INJECTION, SUSPENSION INTRA-ARTICULAR; INTRAMUSCULAR at 09:14

## 2024-06-04 NOTE — PROGRESS NOTES
[facility] [place]     Office Visit       Date: 06/04/2024   Patient Name: Erwin Morel  MRN: [id number]  YOB: 1975    Referring Physician: Tegan Morel APRN     Chief Complaint:   Chief Complaint   Patient presents with    Right Elbow - Pain       History of Present Illness:   Erwin Morel is a 49 y.o. male who presents with new problem of: right elbow pain.  Onset: atraumatic and gradual in nature. The issue has been ongoing for 2 month(s). Pain is a 7/10 on the pain scale. Pain is described as throbbing and shooting. Associated symptoms include pain and stiffness. The pain is worse with any movement of the joint; pain medication and/or NSAID improve the pain. Previous treatments have included: NSAIDS.    Subjective   Review of Systems: Review of Systems   Constitutional:  Negative for chills, fever, unexpected weight gain and unexpected weight loss.   HENT:  Negative for congestion, postnasal drip and rhinorrhea.    Eyes:  Negative for blurred vision.   Respiratory:  Negative for shortness of breath.    Cardiovascular:  Negative for leg swelling.   Gastrointestinal:  Negative for abdominal pain, nausea and vomiting.   Genitourinary:  Negative for difficulty urinating.   Musculoskeletal:  Positive for arthralgias. Negative for gait problem, joint swelling and myalgias.   Skin:  Negative for skin lesions and wound.   Neurological:  Negative for dizziness, weakness, light-headedness and numbness.   Hematological:  Does not bruise/bleed easily.   Psychiatric/Behavioral:  Negative for depressed mood.         I have reviewed the following portions of the patient's history:History of Present Illness and review of systems.    Past Medical History:   Past Medical History:   Diagnosis Date    Hypertension     Valvular heart disease        Past Surgical History:   Past Surgical History:   Procedure Laterality Date    CHOLECYSTECTOMY       "VASECTOMY         Family History:   Family History   Problem Relation Age of Onset    Heart disease Mother     Heart disease Father     No Known Problems Brother        Social History:   Social History     Socioeconomic History    Marital status:    Tobacco Use    Smoking status: Never     Passive exposure: Never    Smokeless tobacco: Never   Vaping Use    Vaping status: Never Used   Substance and Sexual Activity    Alcohol use: No    Drug use: No    Sexual activity: Defer       Medications: No current outpatient medications on file.    Allergies: No Known Allergies    I reviewed the patient's chief complaint, history of present illness, review of systems, past medical history, surgical history, family history, social history, medications and allergy list.     Objective    Vital Signs:   Vitals:    06/04/24 0852   BP: 142/96   Weight: 104 kg (228 lb 8 oz)   Height: 187 cm (73.62\")     Body mass index is 29.64 kg/m².   BMI is >= 25 and <30. (Overweight) The following options were offered after discussion;: exercise counseling/recommendations and nutrition counseling/recommendations      Patient reports that he is a non-smoker and has not ever been a smoker.  This behavior was applauded and he was encouraged to continue in smoking cessation.  We will continue to monitor at subsequent visits.    Ortho Exam:  Constitutional: General Appearance: healthy-appearing, NAD, and normal body habitus.   Psychiatric: Orientation: oriented to person, place, and time. Mood and Affect: normal mood and affect and active and alert.   Skin: Right Upper Extremity: normal. Left Upper Extremity: normal.   Right Elbow Exam:   Normal contour of the elbow forearm and wrist.   Negative ecchymosis and negative swelling.   Full range of motion of the elbow without pain. Full supination and pronation.   no laxity with varus or valgus stress   Positive tenderness to palpation over the lateral epicondyle and along the extensor digitorum " tendon origin.   Pain with resisted wrist extension.   Pain with resisted middle finger extension.   Negative pain with index finger extension.   Sensation grossly intact to all digits.   Radial pulse intact.  Left Elbow Exam:   shows no evidence of tenderness to palpation.   no pain with extension of the wrist or middle finger.   Sensation grossly intact throughout.    Results Review:   Imaging Results (Last 24 Hours)       Procedure Component Value Units Date/Time    XR Elbow 3+ View Right [[id number]] Resulted: 06/04/24 0845     Updated: 06/04/24 0850        I personally reviewed and interpreted the radiographs of the right elbow from 6/4/2024.  No acute fracture or dislocation.  No significant degenerative changes identified.  No large enthesophytes.    Procedures    Assessment / Plan    Assessment/Plan:   Diagnoses and all orders for this visit:    1. Right elbow pain (Primary)  -     XR Elbow 3+ View Right    2. Right lateral epicondylitis    3. Elbow swelling, right    Other orders  -     - Hand/Upper Extremity Injection    Right lateral elbow pain ongoing for the last 2 months.  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  He has tried ice and NSAIDs but without significant relief of symptoms.  They are quite limiting at this time.  Radiographs of his right elbow do not show any acute or degenerative osseous abnormality.  We reviewed them in detail.  I discussed with him the diagnosis of lateral epicondylitis.  I recommend that we give him a home exercise program of stretches.  Discussed with him activity modification is much as possible reasonably.  Continue with the ice and NSAIDs.  Discussed the role for corticosteroid injection into the right elbow today.  He elects proceed and tolerated well.  See procedure note.  Also fitting for a strap brace that may benefit him at work as well.  I will see him back as his symptoms dictate.    Previous  imaging studies reviewed: 6/4/2024-radiographs of the right elbow.    Previous documentation reviewed: 4/8/2024-office visit-Tegan S[facility].    Previous laboratory results reviewed: 4/19/2022-creatinine 1.01, EGFR 93.    Follow Up:   No follow-ups on file.      Clay Brown MD  [facility]

## 2024-06-04 NOTE — PROGRESS NOTES
Procedure   - Hand/Upper Extremity Injection: R elbow for lateral epicondylitis on 6/4/2024 9:14 AM  Indications: pain  Details: 25 G needle, lateral approach  Medications: 2 mL lidocaine PF 1% 1 %; 40 mg triamcinolone acetonide 40 MG/ML  Outcome: tolerated well, no immediate complications  Procedure, treatment alternatives, risks and benefits explained, specific risks discussed. Consent was given by the patient. Immediately prior to procedure a time out was called to verify the correct patient, procedure, equipment, support staff and site/side marked as required. Patient was prepped and draped in the usual sterile fashion.

## 2024-07-19 ENCOUNTER — OFFICE VISIT (OUTPATIENT)
Dept: FAMILY MEDICINE CLINIC | Facility: CLINIC | Age: 49
End: 2024-07-19
Payer: COMMERCIAL

## 2024-07-19 VITALS
WEIGHT: 223 LBS | HEIGHT: 74 IN | HEART RATE: 74 BPM | SYSTOLIC BLOOD PRESSURE: 140 MMHG | DIASTOLIC BLOOD PRESSURE: 100 MMHG | BODY MASS INDEX: 28.62 KG/M2 | OXYGEN SATURATION: 95 %

## 2024-07-19 DIAGNOSIS — I10 PRIMARY HYPERTENSION: ICD-10-CM

## 2024-07-19 DIAGNOSIS — Z13.1 SCREENING FOR DIABETES MELLITUS: ICD-10-CM

## 2024-07-19 DIAGNOSIS — Z00.00 ROUTINE MEDICAL EXAM: Primary | ICD-10-CM

## 2024-07-19 DIAGNOSIS — Z13.220 SCREENING FOR LIPID DISORDERS: ICD-10-CM

## 2024-07-19 DIAGNOSIS — E56.9 VITAMIN DEFICIENCY: ICD-10-CM

## 2024-07-19 DIAGNOSIS — R42 DIZZINESS: ICD-10-CM

## 2024-07-19 DIAGNOSIS — M54.2 NECK PAIN: ICD-10-CM

## 2024-07-19 PROCEDURE — 99396 PREV VISIT EST AGE 40-64: CPT | Performed by: NURSE PRACTITIONER

## 2024-07-19 RX ORDER — LOSARTAN POTASSIUM 25 MG/1
25 TABLET ORAL DAILY
Qty: 90 TABLET | Refills: 3 | Status: SHIPPED | OUTPATIENT
Start: 2024-07-19

## 2024-07-19 RX ORDER — LOSARTAN POTASSIUM 25 MG/1
25 TABLET ORAL DAILY
Qty: 90 TABLET | Refills: 3 | Status: SHIPPED | OUTPATIENT
Start: 2024-07-19 | End: 2024-07-19 | Stop reason: SDUPTHER

## 2024-07-19 NOTE — PROGRESS NOTES
"Chief Complaint  Annual Exam    Subjective          Erwin Morel presents to [facility] for preventative yearly exam.   History of Present Illness  Pt is here for a physical exam and labs. His BP has been elevated for the past few weeks. He has had chronic GI issues with abdominal pain and has seen GI. He has had neck pain at times. He has had dizziness and blurred vision at times.      Objective   Vital Signs:   /100   Pulse 74   Ht 187 cm (73.62\")   Wt 101 kg (223 lb)   SpO2 95%   BMI 28.93 kg/m²     Body mass index is 28.93 kg/m².    Predictive Model Details   No score data available for Risk of Fall        PHQ-9 Depression Screening  Little interest or pleasure in doing things?     Feeling down, depressed, or hopeless?     Trouble falling or staying asleep, or sleeping too much?     Feeling tired or having little energy?     Poor appetite or overeating?     Feeling bad about yourself - or that you are a failure or have let yourself or your family down?     Trouble concentrating on things, such as reading the newspaper or watching television?     Moving or speaking so slowly that other people could have noticed? Or the opposite - being so fidgety or restless that you have been moving around a lot more than usual?     Thoughts that you would be better off dead, or of hurting yourself in some way?     PHQ-9 Total Score     If you checked off any problems, how difficult have these problems made it for you to do your work, take care of things at home, or get along with other people?       Health Maintenance   Topic Date Due    TDAP/TD VACCINES (1 - Tdap) Never done    HEPATITIS C SCREENING  Never done    ANNUAL PHYSICAL  Never done    COVID-19 Vaccine (1 - 2023-24 season) Never done    INFLUENZA VACCINE  08/01/2024    BMI FOLLOWUP  06/04/2025    COLORECTAL CANCER SCREENING  01/22/2029    Pneumococcal Vaccine 0-64  Aged Out          There is no immunization history on file " for this patient.    Review of Systems   Constitutional:  Negative for fatigue and fever.   Respiratory:  Negative for shortness of breath.    Cardiovascular:  Negative for chest pain, palpitations and leg swelling.   Musculoskeletal:  Positive for neck pain.   Neurological:  Positive for dizziness. Negative for syncope.   Psychiatric/Behavioral:  The patient is not nervous/anxious.         Past History:  Medical History: has a past medical history of Colon polyp (2024), Hypertension, and Valvular heart disease.   Surgical History: has a past surgical history that includes Cholecystectomy and Vasectomy.   Family History: family history includes Cancer in his mother; Heart disease in his father and mother; No Known Problems in his brother.   Social History: reports that he has never smoked. He has never been exposed to tobacco smoke. He has never used smokeless tobacco. He reports that he does not drink alcohol and does not use drugs.       Allergies: Patient has no known allergies.    Physical Exam  Constitutional:       Appearance: Normal appearance.   HENT:      Head: Normocephalic.   Eyes:      Conjunctiva/sclera: Conjunctivae normal.      Pupils: Pupils are equal, round, and reactive to light.   Cardiovascular:      Rate and Rhythm: Normal rate and regular rhythm.      Heart sounds: Normal heart sounds.   Pulmonary:      Effort: Pulmonary effort is normal.      Breath sounds: Normal breath sounds.   Abdominal:      Tenderness: There is no abdominal tenderness.   Musculoskeletal:         General: Normal range of motion.   Skin:     General: Skin is warm and dry.      Capillary Refill: Capillary refill takes less than 2 seconds.   Neurological:      General: No focal deficit present.      Mental Status: He is alert and oriented to person, place, and time.   Psychiatric:         Mood and Affect: Mood normal.         Behavior: Behavior normal.         Thought Content: Thought content normal.         Judgment:  Judgment normal.          Result Review :                   Assessment and Plan    Diagnoses and all orders for this visit:    1. Routine medical exam (Primary)  Comments:  We discussed diet, exercise, and prev counseling. UTD on colonoscopy. Labs drawn.  Orders:  -     CBC & Differential  -     Comprehensive Metabolic Panel  -     TSH    2. Screening for diabetes mellitus  -     Hemoglobin A1c    3. Screening for lipid disorders  -     Lipid Panel    4. Vitamin deficiency  -     Vitamin B12  -     Vitamin D,25-Hydroxy  -     Folate    5. Neck pain  Comments:  XR ordered.  Orders:  -     XR Spine Cervical 2 or 3 View; Future    6. Dizziness  Comments:  Doppler ordered.  Orders:  -     Duplex Carotid Ultrasound CAR; Future    7. Primary hypertension  Comments:  Begin Losartan. We discussed diet and exercise. Monitor BP.  Orders:  -     losartan (Cozaar) 25 MG tablet; Take 1 tablet by mouth Daily.  Dispense: 90 tablet; Refill: 3    Other orders  -     Discontinue: losartan (Cozaar) 25 MG tablet; Take 1 tablet by mouth Daily.  Dispense: 90 tablet; Refill: 3  -     Discontinue: losartan (Cozaar) 25 MG tablet; Take 1 tablet by mouth Daily.  Dispense: 90 tablet; Refill: 3                Current Outpatient Medications:     losartan (Cozaar) 25 MG tablet, Take 1 tablet by mouth Daily., Disp: 90 tablet, Rfl: 3    Follow Up   Return in about 6 months (around 1/19/2025) for Recheck.  Patient was given instructions and counseling regarding his condition or for health maintenance advice. Please see specific information pulled into the AVS if appropriate.     FRANCY Valencia

## 2024-07-20 LAB
25(OH)D3+25(OH)D2 SERPL-MCNC: 46 NG/ML (ref 30–100)
ALBUMIN SERPL-MCNC: 4.4 G/DL (ref 4.1–5.1)
ALP SERPL-CCNC: 55 IU/L (ref 44–121)
ALT SERPL-CCNC: 25 IU/L (ref 0–44)
AST SERPL-CCNC: 21 IU/L (ref 0–40)
BASOPHILS # BLD AUTO: 0.1 X10E3/UL (ref 0–0.2)
BASOPHILS NFR BLD AUTO: 1 %
BILIRUB SERPL-MCNC: 0.7 MG/DL (ref 0–1.2)
BUN SERPL-MCNC: 15 MG/DL (ref 6–24)
BUN/CREAT SERPL: 13 (ref 9–20)
CALCIUM SERPL-MCNC: 9.8 MG/DL (ref 8.7–10.2)
CHLORIDE SERPL-SCNC: 104 MMOL/L (ref 96–106)
CHOLEST SERPL-MCNC: 231 MG/DL (ref 100–199)
CO2 SERPL-SCNC: 26 MMOL/L (ref 20–29)
CREAT SERPL-MCNC: 1.2 MG/DL (ref 0.76–1.27)
EGFRCR SERPLBLD CKD-EPI 2021: 75 ML/MIN/1.73
EOSINOPHIL # BLD AUTO: 0.1 X10E3/UL (ref 0–0.4)
EOSINOPHIL NFR BLD AUTO: 2 %
ERYTHROCYTE [DISTWIDTH] IN BLOOD BY AUTOMATED COUNT: 12.3 % (ref 11.6–15.4)
FOLATE SERPL-MCNC: 2.5 NG/ML
GLOBULIN SER CALC-MCNC: 2.5 G/DL (ref 1.5–4.5)
GLUCOSE SERPL-MCNC: 88 MG/DL (ref 70–99)
HBA1C MFR BLD: 5.8 % (ref 4.8–5.6)
HCT VFR BLD AUTO: 47.7 % (ref 37.5–51)
HDLC SERPL-MCNC: 53 MG/DL
HGB BLD-MCNC: 15.6 G/DL (ref 13–17.7)
IMM GRANULOCYTES # BLD AUTO: 0.1 X10E3/UL (ref 0–0.1)
IMM GRANULOCYTES NFR BLD AUTO: 1 %
LDLC SERPL CALC-MCNC: 150 MG/DL (ref 0–99)
LYMPHOCYTES # BLD AUTO: 1.7 X10E3/UL (ref 0.7–3.1)
LYMPHOCYTES NFR BLD AUTO: 21 %
MCH RBC QN AUTO: 29.5 PG (ref 26.6–33)
MCHC RBC AUTO-ENTMCNC: 32.7 G/DL (ref 31.5–35.7)
MCV RBC AUTO: 90 FL (ref 79–97)
MONOCYTES # BLD AUTO: 0.7 X10E3/UL (ref 0.1–0.9)
MONOCYTES NFR BLD AUTO: 9 %
NEUTROPHILS # BLD AUTO: 5.5 X10E3/UL (ref 1.4–7)
NEUTROPHILS NFR BLD AUTO: 66 %
PLATELET # BLD AUTO: 257 X10E3/UL (ref 150–450)
POTASSIUM SERPL-SCNC: 4.6 MMOL/L (ref 3.5–5.2)
PROT SERPL-MCNC: 6.9 G/DL (ref 6–8.5)
RBC # BLD AUTO: 5.28 X10E6/UL (ref 4.14–5.8)
SODIUM SERPL-SCNC: 143 MMOL/L (ref 134–144)
TRIGL SERPL-MCNC: 154 MG/DL (ref 0–149)
TSH SERPL DL<=0.005 MIU/L-ACNC: 1.66 UIU/ML (ref 0.45–4.5)
VIT B12 SERPL-MCNC: 290 PG/ML (ref 232–1245)
VLDLC SERPL CALC-MCNC: 28 MG/DL (ref 5–40)
WBC # BLD AUTO: 8.2 X10E3/UL (ref 3.4–10.8)

## 2024-07-21 NOTE — PROGRESS NOTES
Your A1C showed prediabetes so try to watch your diet more closely for sugars, fats, and carbohydrates. Try to exercise several days per week. Your Folate was low so take 1mg of folic acid daily. Your cholesterol levels were a bit elevated so hopefully diet and exercise will help this also. Your B12 was on the low end of normal so take a B12 supplement also. Everything else looked good. Thanks!

## 2024-07-24 DIAGNOSIS — I38 VALVULAR HEART DISEASE: Primary | ICD-10-CM

## 2024-07-24 DIAGNOSIS — R42 DIZZINESS: Primary | ICD-10-CM

## 2024-08-28 ENCOUNTER — OFFICE VISIT (OUTPATIENT)
Dept: CARDIOLOGY | Facility: CLINIC | Age: 49
End: 2024-08-28
Payer: COMMERCIAL

## 2024-08-28 VITALS
WEIGHT: 225.8 LBS | SYSTOLIC BLOOD PRESSURE: 114 MMHG | BODY MASS INDEX: 28.98 KG/M2 | HEIGHT: 74 IN | HEART RATE: 66 BPM | OXYGEN SATURATION: 98 % | DIASTOLIC BLOOD PRESSURE: 84 MMHG

## 2024-08-28 DIAGNOSIS — I38 VHD (VALVULAR HEART DISEASE): Primary | ICD-10-CM

## 2024-08-28 DIAGNOSIS — Z82.49 FAMILY HISTORY OF CORONARY ARTERY DISEASE: ICD-10-CM

## 2024-08-28 DIAGNOSIS — I10 ESSENTIAL HYPERTENSION: ICD-10-CM

## 2024-08-28 NOTE — PROGRESS NOTES
[facility]    Encounter Date: 2024    Patient ID: Erwin Morel is a 49 y.o. male.  : 1975     PCP: Darrell Kwan MD       Chief Complaint: Aortic Valve Insufficiency (F/U)      PROBLEM LIST:  VHD  Estimated EF = 55%.Normal LV systolic function wall motion, Trace to mild aortic insufficiency,Trace mitral regurgitation, Trace tricuspid regurgitationEcho, 2024: EF 62%. LOLITA 2.4 cm2. Mild aortic sclerosis with mild AI. Aortic valve area is 2.4 cm2.Mild aortic sclerosis with mild AI. Peak velocity of the flow distal to the aortic valve is 113.5 cm/s. Aortic valve maximum pressure gradient is 5 mmHg. Aortic valve mean pressure gradient is 3 mmHg. The mitral valve peak gradient is 3 mmHg. The mitral valve mean gradient is 1 mmHg. The mitral valve area (PHT) is 3.7 cm2. Normal RVSP.  (Personally reviewed by Dr. Anaya: No evidence of diastolic dysfunction.  No LVH.)  Carotid disease, 2024: Mild luminal irregularities.  Hypertension  Hyperlipidemia  Obstructive sleep apnea, severe    History of Present Illness  Patient presents today for a follow-up with a history of VHD and cardiac risk factors. Since last visit, Erwin has had some issues with dizziness and blurred vision as well as headaches.  He was found to have elevated blood pressures which his primary physician is currently working on.  He was also found to have severe obstructive sleep apnea.  He does not have his CPAP device yet but apparently his oxygen level dropped as low as 82% and he had 251 apneic episodes during his study.  He and his wife are walking some but he is not doing any strenuous exercise.  He had an echocardiogram in [place].  The echo is essentially identical to his previous study in 2019.    No Known Allergies      Current Outpatient Medications:   •  losartan (Cozaar) 25 MG tablet, Take 1 tablet by mouth Daily. (Patient taking differently: Take 2 tablets by mouth  "Daily. Per PCP, FRANCY Gavin), Disp: 90 tablet, Rfl: 3  No current facility-administered medications for this visit.    Facility-Administered Medications Ordered in Other Visits:   •  iopamidol (ISOVUE-370) 76 % injection 90 mL, 90 mL, Intravenous, Once in imaging, Adriel FRANCY Nino    The following portions of the patient's history were reviewed and updated as appropriate: allergies, current medications, past family history, past medical history, past social history, past surgical history and problem list.    ROS  Review of Systems   14 point ROS negative except for that listed in the HPI.         Objective:     /84 (BP Location: Left arm, Patient Position: Sitting, Cuff Size: Adult)   Pulse 66   Ht 188 cm (74\")   Wt 102 kg (225 lb 12.8 oz)   SpO2 98%   BMI 28.99 kg/m²      Physical Exam  Constitutional: Patient appears well-developed and well-nourished.   HENT: HEENT exam unremarkable.   Neck: Neck supple. No JVD present. No carotid bruits.   Cardiovascular: Normal rate, regular rhythm and normal heart sounds. No murmur heard.   2+ symmetric pulses.   Pulmonary/Chest: Breath sounds normal.  No rales or wheezes are heard.  Abdominal: Abdomen benign.   Musculoskeletal: Does not exhibit edema.   Neurological: Neurological exam unremarkable.   Vitals reviewed.    Data Review:   Lab Results   Component Value Date    GLUCOSE 88 07/19/2024    BUN 15 07/19/2024    CREATININE 1.20 07/19/2024    BCR 13 07/19/2024     07/19/2024    K 4.6 07/19/2024    CO2 26 07/19/2024    CALCIUM 9.8 07/19/2024    ALBUMIN 4.4 07/19/2024    AST 21 07/19/2024    ALT 25 07/19/2024     Lab Results   Component Value Date    CHLPL 231 (H) 07/19/2024    TRIG 154 (H) 07/19/2024    HDL 53 07/19/2024     (H) 07/19/2024      Lab Results   Component Value Date    WBC 8.2 07/19/2024    RBC 5.28 07/19/2024    HGB 15.6 07/19/2024    HCT 47.7 07/19/2024    MCV 90 07/19/2024     07/19/2024     Lab Results "   Component Value Date    TSH 1.660 07/19/2024     Lab Results   Component Value Date    HGBA1C 5.8 (H) 07/19/2024          ECG 12 Lead    Date/Time: 8/28/2024 4:52 PM  Performed by: Preethi Anaya MD    Authorized by: Preethi Anaya MD  Comparison: compared with previous ECG from 10/30/2019  Similar to previous ECG  Rhythm: sinus rhythm  BPM: 66    Clinical impression: normal ECG                      Assessment:      Diagnosis Plan   1. VHD (valvular heart disease)        2. Essential hypertension        3. Family history of coronary artery disease          Plan:   Stable cardiac status.   Continue current medications.   FU in 36 months, sooner as needed.  Repeat echocardiogram just prior to follow-up.  Thank you for allowing us to participate in the care of your patient.       Preethi Anaya MD, [facility]        Please note that portions of this note may have been completed with a voice recognition program. Efforts were made to edit the dictations, but occasionally words are mistranscribed.

## 2024-09-06 DIAGNOSIS — I10 PRIMARY HYPERTENSION: ICD-10-CM

## 2024-09-06 RX ORDER — LOSARTAN POTASSIUM 50 MG/1
50 TABLET ORAL DAILY
Qty: 90 TABLET | Refills: 3 | Status: SHIPPED | OUTPATIENT
Start: 2024-09-06